# Patient Record
Sex: MALE | Race: WHITE
[De-identification: names, ages, dates, MRNs, and addresses within clinical notes are randomized per-mention and may not be internally consistent; named-entity substitution may affect disease eponyms.]

---

## 2018-03-05 ENCOUNTER — HOSPITAL ENCOUNTER (EMERGENCY)
Dept: HOSPITAL 62 - ER | Age: 33
Discharge: HOME | End: 2018-03-05
Payer: SELF-PAY

## 2018-03-05 VITALS — SYSTOLIC BLOOD PRESSURE: 150 MMHG | DIASTOLIC BLOOD PRESSURE: 93 MMHG

## 2018-03-05 DIAGNOSIS — I10: ICD-10-CM

## 2018-03-05 DIAGNOSIS — B37.2: Primary | ICD-10-CM

## 2018-03-05 DIAGNOSIS — E11.628: ICD-10-CM

## 2018-03-05 LAB
ADD MANUAL DIFF: NO
ALBUMIN SERPL-MCNC: 4.2 G/DL (ref 3.5–5)
ALP SERPL-CCNC: 109 U/L (ref 38–126)
ALT SERPL-CCNC: 58 U/L (ref 21–72)
ANION GAP SERPL CALC-SCNC: 13 MMOL/L (ref 5–19)
APPEARANCE UR: (no result)
APTT PPP: YELLOW S
AST SERPL-CCNC: 26 U/L (ref 17–59)
BASOPHILS # BLD AUTO: 0.1 10^3/UL (ref 0–0.2)
BASOPHILS NFR BLD AUTO: 0.8 % (ref 0–2)
BILIRUB DIRECT SERPL-MCNC: 0.5 MG/DL (ref 0–0.4)
BILIRUB SERPL-MCNC: 1.1 MG/DL (ref 0.2–1.3)
BILIRUB UR QL STRIP: NEGATIVE
BUN SERPL-MCNC: 12 MG/DL (ref 7–20)
CALCIUM: 9.5 MG/DL (ref 8.4–10.2)
CHLORIDE SERPL-SCNC: 101 MMOL/L (ref 98–107)
CO2 SERPL-SCNC: 25 MMOL/L (ref 22–30)
EOSINOPHIL # BLD AUTO: 0.1 10^3/UL (ref 0–0.6)
EOSINOPHIL NFR BLD AUTO: 1.2 % (ref 0–6)
ERYTHROCYTE [DISTWIDTH] IN BLOOD BY AUTOMATED COUNT: 14 % (ref 11.5–14)
GLUCOSE SERPL-MCNC: 384 MG/DL (ref 75–110)
GLUCOSE UR STRIP-MCNC: >=500 MG/DL
HCT VFR BLD CALC: 44.8 % (ref 37.9–51)
HGB BLD-MCNC: 15.1 G/DL (ref 13.5–17)
KETONES UR STRIP-MCNC: (no result) MG/DL
LYMPHOCYTES # BLD AUTO: 1.8 10^3/UL (ref 0.5–4.7)
LYMPHOCYTES NFR BLD AUTO: 16 % (ref 13–45)
MCH RBC QN AUTO: 27.7 PG (ref 27–33.4)
MCHC RBC AUTO-ENTMCNC: 33.7 G/DL (ref 32–36)
MCV RBC AUTO: 82 FL (ref 80–97)
MONOCYTES # BLD AUTO: 0.7 10^3/UL (ref 0.1–1.4)
MONOCYTES NFR BLD AUTO: 6.3 % (ref 3–13)
NEUTROPHILS # BLD AUTO: 8.3 10^3/UL (ref 1.7–8.2)
NEUTS SEG NFR BLD AUTO: 75.7 % (ref 42–78)
NITRITE UR QL STRIP: NEGATIVE
PH UR STRIP: 6 [PH] (ref 5–9)
PLATELET # BLD: 218 10^3/UL (ref 150–450)
POTASSIUM SERPL-SCNC: 4.3 MMOL/L (ref 3.6–5)
PROT SERPL-MCNC: 6.9 G/DL (ref 6.3–8.2)
PROT UR STRIP-MCNC: 30 MG/DL
RBC # BLD AUTO: 5.45 10^6/UL (ref 4.35–5.55)
SODIUM SERPL-SCNC: 138.7 MMOL/L (ref 137–145)
SP GR UR STRIP: 1.04
TOTAL CELLS COUNTED % (AUTO): 100 %
UROBILINOGEN UR-MCNC: NEGATIVE MG/DL (ref ?–2)
WBC # BLD AUTO: 10.9 10^3/UL (ref 4–10.5)

## 2018-03-05 PROCEDURE — 80053 COMPREHEN METABOLIC PANEL: CPT

## 2018-03-05 PROCEDURE — 85025 COMPLETE CBC W/AUTO DIFF WBC: CPT

## 2018-03-05 PROCEDURE — 82962 GLUCOSE BLOOD TEST: CPT

## 2018-03-05 PROCEDURE — 36415 COLL VENOUS BLD VENIPUNCTURE: CPT

## 2018-03-05 PROCEDURE — 83036 HEMOGLOBIN GLYCOSYLATED A1C: CPT

## 2018-03-05 PROCEDURE — 81001 URINALYSIS AUTO W/SCOPE: CPT

## 2018-03-05 PROCEDURE — 99284 EMERGENCY DEPT VISIT MOD MDM: CPT

## 2018-03-05 NOTE — PDOC CONSULTATION
Consultation


Consult reason:: Rule out necrotizing fasciitis





History of Present Illness


History of Present Illness: 


ANDREAS GARCIA is a 32 year old male





Please see HPI per Dr. GARCÍA2 emergency department.  Apparently the patient was 

recently diagnosed with diabetes mellitus.





Past Medical History


Cardiac Medical History: Reports: Hypertension - no meds





Social History


Smoking Status: Never Smoker





Family History


Family History: Reviewed & Not Pertinent


Parental Family History Reviewed: Yes


Children Family History Reviewed: Yes


Sibling(s) Family History Reviewed.: Yes





Medication/Allergy


Home Medications: 








Metformin HCl 1,000 mg PO BID #60 tablet 03/05/18 


Nystatin 30 gm TP BID 30 Days  oint...g. 03/05/18 








Allergies/Adverse Reactions: 


 





No Known Allergies Allergy (Unverified 03/05/18 11:49)


 











Physical Exam


Vital Signs: 


 











Temp Pulse Resp BP Pulse Ox


 


 98.5 F   87   16   150/93 H  97 


 


 03/05/18 12:03  03/05/18 15:46  03/05/18 15:46  03/05/18 15:46  03/05/18 15:46








 Intake & Output











 03/04/18 03/05/18 03/06/18





 06:59 06:59 06:59


 


Weight   154.9 kg











General appearance: PRESENT: no acute distress, other


GI/Abdominal exam: PRESENT: other - Patient's lower pelvic region, and 

intertriginous nodes examined as well as the scrotum.  There is erythematous 

maculopapular rash with easy drainage.  The penis is receded within the scrotum


Skin exam: PRESENT: other - Is no evidence of cellulitis a sending infection 

etc.





Results


Laboratory Results: 


 





 03/05/18 13:25 





 03/05/18 13:25 





 











  03/05/18 03/05/18 03/05/18





  13:25 13:25 13:25


 


WBC  10.9 H  


 


RBC  5.45  


 


Hgb  15.1  


 


Hct  44.8  


 


MCV  82  


 


MCH  27.7  


 


MCHC  33.7  


 


RDW  14.0  


 


Plt Count  218  


 


Seg Neutrophils %  75.7  


 


Lymphocytes %  16.0  


 


Monocytes %  6.3  


 


Eosinophils %  1.2  


 


Basophils %  0.8  


 


Absolute Neutrophils  8.3 H  


 


Absolute Lymphocytes  1.8  


 


Absolute Monocytes  0.7  


 


Absolute Eosinophils  0.1  


 


Absolute Basophils  0.1  


 


Sodium   138.7 


 


Potassium   4.3 


 


Chloride   101 


 


Carbon Dioxide   25 


 


Anion Gap   13 


 


BUN   12 


 


Creatinine   0.63 


 


Est GFR ( Amer)   > 60 


 


Est GFR (Non-Af Amer)   > 60 


 


Glucose   384 H 


 


Calcium   9.5 


 


Total Bilirubin   1.1 


 


AST   26 


 


ALT   58 


 


Alkaline Phosphatase   109 


 


Total Protein   6.9 


 


Albumin   4.2 


 


Urine Color    YELLOW


 


Urine Appearance    SLIGHTLY-CLOUDY


 


Urine pH    6.0


 


Ur Specific Gravity    1.037


 


Urine Protein    30 H


 


Urine Glucose (UA)    >=500 H


 


Urine Ketones    TRACE H


 


Urine Blood    SMALL H


 


Urine Nitrite    NEGATIVE


 


Ur Leukocyte Esterase    SMALL H


 


Urine WBC (Auto)    25


 


Urine RBC (Auto)    21














Assessment & Plan





- Diagnosis


(1) Yeast infection


Is this a current diagnosis for this admission?: Yes   


Plan: 


Agree with Dr. DE LUNA; no clinical evidence for necrotizing fasciitis.  Cutaneous 

manifestation of fungal infection weekly.  Recommendations:





1.  Treat with topical antifungal's.


2.  Suggest washing with Hibiclens scrub brushes.


3.  Return to medical care on an as-needed basis

## 2018-03-05 NOTE — ER DOCUMENT REPORT
ED General





- General


Chief Complaint: Testicular Swelling


Stated Complaint: RASH


Time Seen by Provider: 03/05/18 13:07


Mode of Arrival: Ambulatory


Information source: Patient


Notes: 





32-year-old male who has never had sexual intercourse presents with complaints 

of testicular redness rash.  He denies any pain.  Patient notes there is 

swelling in the area.  Patient was seen at an urgent care told he had an STD.


Patient denies any medical problems otherwise


TRAVEL OUTSIDE OF THE [place] IN LAST 30 DAYS: No





- HPI


Onset: Last week


Onset/Duration: Persistent


Quality of pain: No pain


Severity: Moderate


Pain Level: Denies


Associated symptoms: Other


Exacerbated by: Denies


Relieved by: Denies


Similar symptoms previously: No


Recently seen / treated by doctor: Yes





- Related Data


Allergies/Adverse Reactions: 


 





No Known Allergies Allergy (Unverified 03/05/18 11:49)


 











Past Medical History





- Social History


Smoking Status: Never Smoker


Cigarette use (# per day): No


Chew tobacco use (# tins/day): No


Smoking Education Provided: No


Frequency of alcohol use: None


Drug Abuse: None


Family History: Reviewed & Not Pertinent


Patient has suicidal ideation: No


Patient has homicidal ideation: No





- Past Medical History


Cardiac Medical History: Reports: Hx Hypertension - no meds


Renal/ Medical History: Denies: Hx Peritoneal Dialysis





Review of Systems





- Review of Systems


Notes: 





REVIEW OF SYSTEMS:


CONSTITUTIONAL :  Denies fever,  chills, or sweats.  Denies recent illness.


EENT:   Denies eye, ear, throat, or mouth pain or symptoms.  Denies nasal or 

sinus congestion or discharge.  Denies throat, tongue, or mouth swelling or 

difficulty swallowing.


CARDIOVASCULAR:  Denies chest pain.  Denies palpitations or racing or irregular 

heart beat.  Denies ankle edema.


RESPIRATORY:  Denies cough, cold, or chest congestion.  Denies shortness of 

breath, difficulty breathing, or wheezing.


GASTROINTESTINAL:  Denies abdominal pain or distention.  Denies nausea, vomiting

, or diarrhea.  Denies blood in vomitus, stools, or per rectum.  Denies black, 

tarry stools.  Denies constipation.  


GENITOURINARY:  Denies difficulty urinating, painful urination, burning, 

frequency, blood in urine, or discharge.


MUSCULOSKELETAL:  Denies back or neck pain or stiffness.  Denies joint pain or 

swelling.


SKIN:   admits to rash 


HEMATOLOGIC :   Denies easy bruising or bleeding.


LYMPHATIC:  Denies swollen, enlarged glands.


NEUROLOGICAL:  Denies confusion or altered mental status.  Denies passing out 

or loss of consciousness.  Denies dizziness or lightheadedness.  Denies 

headache.  Denies weakness or paralysis or loss of use of either side.  Denies 

problems with gait or speech.  Denies sensory loss, numbness, or tingling.  

Denies seizures.


PSYCHIATRIC:  Denies anxiety or stress.  Denies depression, suicidal ideation, 

or homicidal ideation.





ALL OTHER SYSTEMS REVIEWED AND NEGATIVE.





Dictation was performed using Dragon voice recognition software 





PHYSICAL EXAMINATION:





GENERAL: Well-appearing, well-nourished and in no acute distress.





HEAD: Atraumatic, normocephalic.





EYES: Pupils equal round and reactive to light, extraocular movements intact, 

sclera anicteric, conjunctiva are normal.





ENT: Nares patent, oropharynx clear without exudates.  Moist mucous membranes.





NECK: Normal range of motion, supple without lymphadenopathy





LUNGS: Breath sounds clear to auscultation bilaterally and equal.  No wheezes 

rales or rhonchi.





HEART: Regular rate and rhythm without murmurs





ABDOMEN: Soft, nontender, nondistended abdomen.  No guarding, no rebound.  No 

masses appreciated.





Musculoskeletal: Normal range of motion, no pitting or edema.  No cyanosis.





NEUROLOGICAL: Cranial nerves grossly intact.  Normal speech, normal gait.  

Normal sensory, motor exams 





PSYCH: Normal mood, normal affect.





SKIN: testicular and inguinal erythema consistant with candidal infection , no 

warmth, drainage or tenderness ocncerning for fourniers





Physical Exam





- Vital signs


Vitals: 


 











Temp Pulse Resp BP Pulse Ox


 


 98.5 F   103 H  20   156/89 H  96 


 


 03/05/18 12:03  03/05/18 12:03  03/05/18 12:03  03/05/18 12:03  03/05/18 12:03














Course





- Re-evaluation


Re-evalutation: 





03/05/18 13:43


Dr Kike sanchez , concerns for fourniers vs candidiasis 


03/05/18 14:23


Patient's blood work is consistent with a new onset diabetic, blood sugars 384, 

he will be given insulin, hemoglobin A1c is pending, we have ruled out Augustine'

s and will treat for the yeast infection


03/05/18 16:14


After given insulin patient's blood sugar has improved significantly, his 

hemoglobin A1c is elevated, I do believe he will require both metformin and 

insulin but the patient wishes to defer on any injections at this time, I 

explained risks and benefits,





Patient will be given caring for [facility] to follow-up with as well








After performing a Medical Screening Examination, I estimate there is LOW risk 

for any life threatening rash. At this time the patient looks extremely well 

and there are no signs of systemic infection, however this may change at any 

time and the rash may change.  I have reevaluated this patient multiple times 

and no significant life threatening changes are noted. The patient and I have 

discussed the diagnosis and risks, and we agree with discharging home with 

close follow-up with the understanding that symptoms and presentations can 

change. We also discussed returning to the Emergency Department immediately if 

new or worsening symptoms occur. We have discussed the symptoms which are most 

concerning (e.g., changing or worsening pain, fever, numbness, weakness, cool 

or painful digits) that necessitate immediate return.





- Vital Signs


Vital signs: 


 











Temp Pulse Resp BP Pulse Ox


 


 98.5 F   87   16   150/93 H  97 


 


 03/05/18 12:03  03/05/18 15:46  03/05/18 15:46  03/05/18 15:46  03/05/18 15:46














- Laboratory


Result Diagrams: 


 03/05/18 13:25





 03/05/18 13:25


Laboratory results interpreted by me: 


 











  03/05/18 03/05/18 03/05/18





  13:25 13:25 13:25


 


WBC  10.9 H  


 


Absolute Neutrophils  8.3 H  


 


Glucose   384 H 


 


POC Glucose   


 


Hemoglobin A1c %   


 


Direct Bilirubin   0.5 H 


 


Urine Protein    30 H


 


Urine Glucose (UA)    >=500 H


 


Urine Ketones    TRACE H


 


Urine Blood    SMALL H


 


Ur Leukocyte Esterase    SMALL H














  03/05/18 03/05/18





  13:25 15:55


 


WBC  


 


Absolute Neutrophils  


 


Glucose  


 


POC Glucose   260 H


 


Hemoglobin A1c %  11.2 H 


 


Direct Bilirubin  


 


Urine Protein  


 


Urine Glucose (UA)  


 


Urine Ketones  


 


Urine Blood  


 


Ur Leukocyte Esterase  














Critical Care Note





- Critical Care Note


Total time excluding time spent on procedures (mins): 37


Comments: 





37 minutes of critical care time spent in direct contact evaluating and 

reevaluating the patient, treating symptoms, reviewing labs and studies and 

speaking with family  and consultants excluding any procedures





Discharge





- Discharge


Clinical Impression: 


 Yeast infection





Diabetes


Qualifiers:


 Diabetes mellitus type: type 2 Diabetes mellitus complication status: with 

skin complications Diabetes mellitus complication detail: with other skin 

complication Diabetes mellitus long term insulin use: without long term use 

Qualified Code(s): E11.628 - Type 2 diabetes mellitus with other skin 

complications





Condition: Stable


Disposition: HOME, SELF-CARE


Instructions:  Diabetes ([facility]), Control of Diabetes During Illness ([facility])


Prescriptions: 


Metformin HCl 1,000 mg PO BID #60 tablet


Nystatin 30 gm TP BID 30 Days  oint...g.


Forms:  Return to Work


Referrals: 


AMRIK NUNES MD [NO LOCAL MD] - Follow up tomorrow

## 2018-03-05 NOTE — ER DOCUMENT REPORT
ED Medical Screen (RME)





- General


Chief Complaint: Testicular Swelling


Stated Complaint: RASH


Time Seen by Provider: 03/05/18 13:07


Notes: 





pt states he has rash, pain and discoloration of his testicles.  [facility].  He states he is a virgin and 

there is no way he can have an STD so he came here.


TRAVEL OUTSIDE OF THE [place] IN LAST 30 DAYS: No





- Related Data


Allergies/Adverse Reactions: 


 





No Known Allergies Allergy (Unverified 03/05/18 11:49)


 











Past Medical History





- Social History


Chew tobacco use (# tins/day): No


Frequency of alcohol use: None


Drug Abuse: None





- Past Medical History


Cardiac Medical History: Reports: Hx Hypertension - no meds


Renal/ Medical History: Denies: Hx Peritoneal Dialysis





Physical Exam





- Vital signs


Vitals: 





 











Temp Pulse Resp BP Pulse Ox


 


 98.5 F   103 H  20   156/89 H  96 


 


 03/05/18 12:03  03/05/18 12:03  03/05/18 12:03  03/05/18 12:03  03/05/18 12:03














Course





- Vital Signs


Vital signs: 





 











Temp Pulse Resp BP Pulse Ox


 


 98.5 F   103 H  20   156/89 H  96 


 


 03/05/18 12:03  03/05/18 12:03  03/05/18 12:03  03/05/18 12:03  03/05/18 12:03